# Patient Record
Sex: FEMALE | Race: WHITE | URBAN - METROPOLITAN AREA
[De-identification: names, ages, dates, MRNs, and addresses within clinical notes are randomized per-mention and may not be internally consistent; named-entity substitution may affect disease eponyms.]

---

## 2019-07-01 ENCOUNTER — HISTORICAL (OUTPATIENT)
Dept: ADMINISTRATIVE | Facility: HOSPITAL | Age: 24
End: 2019-07-01

## 2019-07-01 LAB — PRODUCT READY: NORMAL

## 2019-09-01 ENCOUNTER — HOSPITAL ENCOUNTER (OUTPATIENT)
Dept: MEDSURG UNIT | Facility: HOSPITAL | Age: 24
End: 2019-09-03
Attending: SURGERY | Admitting: SURGERY

## 2019-09-01 LAB
ABS NEUT (OLG): 10.4 X10(3)/MCL (ref 2.1–9.2)
ALBUMIN SERPL-MCNC: 2.2 GM/DL (ref 3.4–5)
ALBUMIN/GLOB SERPL: 0.6 RATIO (ref 1.1–2)
ALP SERPL-CCNC: 328 UNIT/L (ref 38–126)
ALT SERPL-CCNC: 52 UNIT/L (ref 12–78)
APPEARANCE, UA: CLEAR
AST SERPL-CCNC: 67 UNIT/L (ref 15–37)
BACTERIA SPEC CULT: ABNORMAL /HPF
BASOPHILS # BLD AUTO: 0.1 X10(3)/MCL (ref 0–0.2)
BASOPHILS NFR BLD AUTO: 1 %
BILIRUB SERPL-MCNC: 0.8 MG/DL (ref 0.2–1)
BILIRUB UR QL STRIP: NEGATIVE
BILIRUBIN DIRECT+TOT PNL SERPL-MCNC: 0.2 MG/DL (ref 0–0.8)
BILIRUBIN DIRECT+TOT PNL SERPL-MCNC: 0.6 MG/DL (ref 0–0.5)
BUN SERPL-MCNC: 6 MG/DL (ref 7–18)
CALCIUM SERPL-MCNC: 8.3 MG/DL (ref 8.5–10.1)
CHLORIDE SERPL-SCNC: 107 MMOL/L (ref 98–107)
CO2 SERPL-SCNC: 28 MMOL/L (ref 21–32)
COLOR UR: YELLOW
CREAT SERPL-MCNC: 0.86 MG/DL (ref 0.55–1.02)
EOSINOPHIL # BLD AUTO: 0.2 X10(3)/MCL (ref 0–0.9)
EOSINOPHIL NFR BLD AUTO: 1 %
ERYTHROCYTE [DISTWIDTH] IN BLOOD BY AUTOMATED COUNT: 13.8 % (ref 11.5–17)
GLOBULIN SER-MCNC: 3.9 GM/DL (ref 2.4–3.5)
GLUCOSE (UA): NEGATIVE
GLUCOSE SERPL-MCNC: 96 MG/DL (ref 74–106)
HCT VFR BLD AUTO: 28 % (ref 37–47)
HGB BLD-MCNC: 8.7 GM/DL (ref 12–16)
HGB UR QL STRIP: ABNORMAL
KETONES UR QL STRIP: ABNORMAL
LEUKOCYTE ESTERASE UR QL STRIP: ABNORMAL
LIPASE SERPL-CCNC: 876 UNIT/L (ref 73–393)
LYMPHOCYTES # BLD AUTO: 1.3 X10(3)/MCL (ref 0.6–4.6)
LYMPHOCYTES NFR BLD AUTO: 10 %
MCH RBC QN AUTO: 28.8 PG (ref 27–31)
MCHC RBC AUTO-ENTMCNC: 31.1 GM/DL (ref 33–36)
MCV RBC AUTO: 92.7 FL (ref 80–94)
MONOCYTES # BLD AUTO: 1.2 X10(3)/MCL (ref 0.1–1.3)
MONOCYTES NFR BLD AUTO: 9 %
NEUTROPHILS # BLD AUTO: 10.4 X10(3)/MCL (ref 2.1–9.2)
NEUTROPHILS NFR BLD AUTO: 79 %
NITRITE UR QL STRIP: NEGATIVE
PH UR STRIP: 6.5 [PH] (ref 5–9)
PLATELET # BLD AUTO: 240 X10(3)/MCL (ref 130–400)
PMV BLD AUTO: 11.1 FL (ref 9.4–12.4)
POTASSIUM SERPL-SCNC: 3.6 MMOL/L (ref 3.5–5.1)
PROT SERPL-MCNC: 6.1 GM/DL (ref 6.4–8.2)
PROT UR QL STRIP: ABNORMAL
RBC # BLD AUTO: 3.02 X10(6)/MCL (ref 4.2–5.4)
RBC #/AREA URNS HPF: 119 /HPF (ref 0–2)
SODIUM SERPL-SCNC: 142 MMOL/L (ref 136–145)
SP GR UR STRIP: 1.01 (ref 1–1.03)
SQUAMOUS EPITHELIAL, UA: 14 /HPF (ref 0–4)
UROBILINOGEN UR STRIP-ACNC: 1
WBC # SPEC AUTO: 13.2 X10(3)/MCL (ref 4.5–11.5)
WBC #/AREA URNS HPF: 36 /HPF (ref 0–3)

## 2019-09-02 LAB
ABS NEUT (OLG): 8.99 X10(3)/MCL (ref 2.1–9.2)
ALBUMIN SERPL-MCNC: 1.9 GM/DL (ref 3.4–5)
ALBUMIN/GLOB SERPL: 0.6 {RATIO}
ALP SERPL-CCNC: 433 UNIT/L (ref 38–126)
ALT SERPL-CCNC: 69 UNIT/L (ref 12–78)
AST SERPL-CCNC: 100 UNIT/L (ref 15–37)
BASOPHILS # BLD AUTO: 0 X10(3)/MCL (ref 0–0.2)
BASOPHILS NFR BLD AUTO: 0 %
BILIRUB SERPL-MCNC: 0.8 MG/DL (ref 0.2–1)
BILIRUBIN DIRECT+TOT PNL SERPL-MCNC: 0.1 MG/DL (ref 0–0.8)
BILIRUBIN DIRECT+TOT PNL SERPL-MCNC: 0.7 MG/DL (ref 0–0.2)
BUN SERPL-MCNC: 6 MG/DL (ref 7–18)
CALCIUM SERPL-MCNC: 8 MG/DL (ref 8.5–10.1)
CHLORIDE SERPL-SCNC: 109 MMOL/L (ref 98–107)
CO2 SERPL-SCNC: 27 MMOL/L (ref 21–32)
CREAT SERPL-MCNC: 0.72 MG/DL (ref 0.55–1.02)
EOSINOPHIL # BLD AUTO: 0.1 X10(3)/MCL (ref 0–0.9)
EOSINOPHIL NFR BLD AUTO: 1 %
ERYTHROCYTE [DISTWIDTH] IN BLOOD BY AUTOMATED COUNT: 13.8 % (ref 11.5–17)
GLOBULIN SER-MCNC: 3.1 GM/DL (ref 2.4–3.5)
GLUCOSE SERPL-MCNC: 104 MG/DL (ref 74–106)
HCT VFR BLD AUTO: 26.9 % (ref 37–47)
HGB BLD-MCNC: 8.4 GM/DL (ref 12–16)
LIPASE SERPL-CCNC: 369 UNIT/L (ref 73–393)
LIPASE SERPL-CCNC: 68 UNIT/L (ref 73–393)
LYMPHOCYTES # BLD AUTO: 1.7 X10(3)/MCL (ref 0.6–4.6)
LYMPHOCYTES NFR BLD AUTO: 14 %
MCH RBC QN AUTO: 29 PG (ref 27–31)
MCHC RBC AUTO-ENTMCNC: 31.2 GM/DL (ref 33–36)
MCV RBC AUTO: 92.8 FL (ref 80–94)
MONOCYTES # BLD AUTO: 0.8 X10(3)/MCL (ref 0.1–1.3)
MONOCYTES NFR BLD AUTO: 7 %
NEUTROPHILS # BLD AUTO: 8.99 X10(3)/MCL (ref 2.1–9.2)
NEUTROPHILS NFR BLD AUTO: 76 %
PLATELET # BLD AUTO: 254 X10(3)/MCL (ref 130–400)
PMV BLD AUTO: 11.6 FL (ref 9.4–12.4)
POTASSIUM SERPL-SCNC: 4.1 MMOL/L (ref 3.5–5.1)
PROT SERPL-MCNC: 5 GM/DL (ref 6.4–8.2)
RBC # BLD AUTO: 2.9 X10(6)/MCL (ref 4.2–5.4)
SODIUM SERPL-SCNC: 143 MMOL/L (ref 136–145)
WBC # SPEC AUTO: 11.9 X10(3)/MCL (ref 4.5–11.5)

## 2019-09-03 LAB
ABS NEUT (OLG): 7.95 X10(3)/MCL (ref 2.1–9.2)
ALBUMIN SERPL-MCNC: 2.1 GM/DL (ref 3.4–5)
ALBUMIN/GLOB SERPL: 0.7 {RATIO}
ALP SERPL-CCNC: 309 UNIT/L (ref 38–126)
ALT SERPL-CCNC: 54 UNIT/L (ref 12–78)
AMYLASE SERPL-CCNC: 41 UNIT/L (ref 25–115)
AST SERPL-CCNC: 38 UNIT/L (ref 15–37)
BASOPHILS # BLD AUTO: 0.1 X10(3)/MCL (ref 0–0.2)
BASOPHILS NFR BLD AUTO: 0 %
BILIRUB SERPL-MCNC: 0.4 MG/DL (ref 0.2–1)
BILIRUBIN DIRECT+TOT PNL SERPL-MCNC: 0.2 MG/DL (ref 0–0.2)
BILIRUBIN DIRECT+TOT PNL SERPL-MCNC: 0.2 MG/DL (ref 0–0.8)
BUN SERPL-MCNC: 5 MG/DL (ref 7–18)
CALCIUM SERPL-MCNC: 8.5 MG/DL (ref 8.5–10.1)
CHLORIDE SERPL-SCNC: 110 MMOL/L (ref 98–107)
CO2 SERPL-SCNC: 25 MMOL/L (ref 21–32)
CREAT SERPL-MCNC: 0.83 MG/DL (ref 0.55–1.02)
EOSINOPHIL # BLD AUTO: 0.2 X10(3)/MCL (ref 0–0.9)
EOSINOPHIL NFR BLD AUTO: 2 %
ERYTHROCYTE [DISTWIDTH] IN BLOOD BY AUTOMATED COUNT: 13.9 % (ref 11.5–17)
GLOBULIN SER-MCNC: 3 GM/DL (ref 2.4–3.5)
GLUCOSE SERPL-MCNC: 79 MG/DL (ref 74–106)
HCT VFR BLD AUTO: 26.8 % (ref 37–47)
HGB BLD-MCNC: 8.2 GM/DL (ref 12–16)
LYMPHOCYTES # BLD AUTO: 1.7 X10(3)/MCL (ref 0.6–4.6)
LYMPHOCYTES NFR BLD AUTO: 15 %
MCH RBC QN AUTO: 28.2 PG (ref 27–31)
MCHC RBC AUTO-ENTMCNC: 30.6 GM/DL (ref 33–36)
MCV RBC AUTO: 92.1 FL (ref 80–94)
MONOCYTES # BLD AUTO: 1 X10(3)/MCL (ref 0.1–1.3)
MONOCYTES NFR BLD AUTO: 9 %
NEUTROPHILS # BLD AUTO: 7.95 X10(3)/MCL (ref 2.1–9.2)
NEUTROPHILS NFR BLD AUTO: 72 %
PLATELET # BLD AUTO: 332 X10(3)/MCL (ref 130–400)
PMV BLD AUTO: 11 FL (ref 9.4–12.4)
POTASSIUM SERPL-SCNC: 3.8 MMOL/L (ref 3.5–5.1)
PROT SERPL-MCNC: 5.1 GM/DL (ref 6.4–8.2)
RBC # BLD AUTO: 2.91 X10(6)/MCL (ref 4.2–5.4)
SODIUM SERPL-SCNC: 144 MMOL/L (ref 136–145)
WBC # SPEC AUTO: 11.1 X10(3)/MCL (ref 4.5–11.5)

## 2019-09-04 LAB — FINAL CULTURE: NORMAL

## 2019-10-01 ENCOUNTER — HISTORICAL (OUTPATIENT)
Dept: ADMINISTRATIVE | Facility: HOSPITAL | Age: 24
End: 2019-10-01

## 2019-10-04 LAB — FINAL CULTURE: NORMAL

## 2019-10-08 LAB
FINAL CULTURE: NORMAL
FINAL CULTURE: NORMAL

## 2021-10-08 ENCOUNTER — HISTORICAL (OUTPATIENT)
Dept: ADMINISTRATIVE | Facility: HOSPITAL | Age: 26
End: 2021-10-08

## 2021-10-08 LAB — PRODUCT READY: NORMAL

## 2022-04-30 NOTE — OP NOTE
Patient:   Bettina Delacruz            MRN: [id number]            FIN: [id number]               Age:   23 years     Sex:  Female     :  1995   Associated Diagnoses:   None   Author:   Ambika Barrios MD      Brief Operative Note   Operative Information   Date/ Time:  9/3/2019 12:00:00.     Preoperative Diagnosis: Cholecystitis  .     Postoperative Diagnosis: same  .     Procedures Performed: laparoscopic Cholecystectomy.     Surgeon: Ambika Barrios MD.     Assistant: Vinay Thomas MD.     Speciman Removed: gallbladder.     Esimated blood loss: loss  15  cc.     Description of Procedure/Findings/    Complications: After informed consent was obtained, the patient was taken to the operative suite and general endotracheal anesthesia was induced. The abdomen was prepped and draped in the normal sterile fashion and a time out was called confirming patient and procedure. A 5mm incision was made just above the umbilicus, a veres needle was used to establish peritoneal insufflation and an Optiview trocar was used to place the camera into the peritoneum. Once the abdomen was insufflated to 15mmHg, under direct visualization, an 11mm trocar was placed in the epigastric region as well as 2 5mm trocars in the right subcostal region. Using graspers to elevate the fundus of the gallbladder over the liver, the cystic duct and cystic artery were identified and a critical view was obtained. I then doubly clipped and ligated the artery and duct. The gallbladder was then removed from the hepatic bed using hook cautery and removed from the abdomen throught the epigastric 11 mm port site. We inspected our clips, they remained in good position, ensured hemostasis, and removed our trocars under direct visualization. The 11mm port site fascia was closed with 0 Vicryl and the skin of the ports were closed with 4-0 vicryl. Sterile dressings were applied. All sponge and needle counts were correct at the  completion of the procedure. The patient was awakened from general anesthesia, extubated, and taken to the PACU in stable condition. .

## 2022-04-30 NOTE — ED PROVIDER NOTES
"   Patient:   Bettina Delacruz            MRN: [id number]            FIN: [id number]               Age:   23 years     Sex:  Female     :  1995   Associated Diagnoses:   Acute cholecystitis   Author:   Davina MALONE MD, Scot SONI      Basic Information   Time seen: Date & time 2019 20:24:00.   History source: Family.   Arrival mode: Wheelchair.   History limitation: Lethargic.   Additional information: Patient's physician(s): Adolph Turcios MD   Provider/Visit info:    No qualifying data available.   .   History of Present Illness   The patient presents with 24y/o F presents to the ED with abdominal pain with n/v.  x 2 day. DX with gallstones. Wendy BOWLES I, Dr. Ghosh, assumed care of this patient at .  23 year old CF with hx of gallstones presents to the ED lethargic, due to taking Percocet tonight for pain. She had a  2 days ago. Her family says she was discharged from the hospital a few hours ago, and she had a "gall bladder attack" with abdominal pain today after being put back on her regular diet.  Her gall bladder attacks have been happening for weeks now. Her family says she was shaking earlier, but deny patient having fever, diarrhea, or constipation..  The onset was just prior to arrival.  The course/duration of symptoms is constant.  The character of symptoms is "gall bladder attack".  The degree at onset was severe.  The Location of pain at onset was abdominal.  The degree at present is severe.  The Location of pain at present is abdominal.  Radiating pain: Unable to obtain due to patient being lethargic. The exacerbating factor is Unable to obtain due to patient being lethargic.  The relieving factor is Unable to obtain due to patient being lethargic.  Therapy today: none.  Risk factors consist of recent surgery and Gall stones.  Associated symptoms: Unable to obtain due to patient being lethargic.        Review of Systems   Constitutional " symptoms:  Weakness, fatigue, no fever, no chills, no sweats.    Skin symptoms:  No rash,    Eye symptoms:  No recent vision problems,    ENMT symptoms:  No ear pain,    Respiratory symptoms:  No shortness of breath, no orthopnea.    Cardiovascular symptoms:  No chest pain, no palpitations.    Gastrointestinal symptoms:  Abdominal pain, nausea, no vomiting, no diarrhea.    Genitourinary symptoms:  No dysuria, no hematuria.    Musculoskeletal symptoms:  No back pain, no Muscle pain.    Psychiatric symptoms:  No anxiety, no depression.    Allergy/immunologic symptoms:  No seasonal allergies, no food allergies.              Additional review of systems information: Unable to obtain due to: Patient is lethargic from taking Percocet.      Health Status   Allergies:    Allergic Reactions (Selected)  Severity Not Documented  Ceclor- Rash.  Penicillin- Rash.  Sulfa drugs- Rash.  Vancomycin- Rash..   Medications: Per nurse's notes.   Immunizations: Up to date.   Menstrual history: Per nurse's notes.      Past Medical/ Family/ Social History   Medical history:    Resolved  Pregnant ([id number]): Onset on 2018 at 22 years.  Resolved on 2019 at 23 years., Gall stones.   Surgical history:     delivery only; ([id number]) on 2019 at 23 Years.   Section on 2019 at 23 Years.  Comments:  2019 15:49 CDT - Vanita BARCLAY, Ethel POTTS  auto-populated from documented surgical case  Biopsy ([id number]) in 2017 at 22 Years.  Comments:  2019 9:32 Julia Andujar RN  on the neck, lump removal.   Family history:    Entire family history is negative..   Social history:    Social & Psychosocial Habits    Tobacco  2019  Use: Never (less than 100 in l    Patient Wants Consult For Cessation Counseling N/A    Abuse/Neglect  2019  SHX Any signs of abuse or neglect No    Feels unsafe at home: Yes  , Alcohol use: Denies, Tobacco use: Denies, Drug use: Denies, Occupation.   Problem list:    Active  "Problems (4)  Acute cholecystitis   Asthma   Failed induction of labor   Rh negative   .      Physical Examination               Vital Signs   Vital Signs   9/1/2019 20:22 CDT       Temperature Oral          37.2 DegC                             Temperature Oral (calculated)             98.96 DegF                             Peripheral Pulse Rate     95 bpm                             Respiratory Rate          20 br/min                             SpO2                      99 %                             Oxygen Therapy            Room air                             Systolic Blood Pressure   139 mmHg                             Diastolic Blood Pressure  79 mmHg  .   Measurements   9/1/2019 20:22 CDT       Weight Dosing             122.5 kg                             Weight Measured and Calculated in Lbs     270.06 lb                             Weight Estimated          122.5 kg                             Height/Length Dosing      162.5 cm  .   Basic Oxygen Information   9/1/2019 20:22 CDT       SpO2                      99 %                             Oxygen Therapy            Room air  Neurological:  Level of consciousness: Lethargic, from taking Percocet.   Skin:  Warm, pink, intact, moist, no rash   Head:  Normocephalic, atraumatic   Cardiovascular:  Regular rate and rhythm, No murmur, Normal peripheral perfusion, No edema   Respiratory:  Lungs are clear to auscultation, respirations are non-labored, breath sounds are equal, Symmetrical chest wall expansion   Gastrointestinal:  Soft, Non distended, Normal bowel sounds, Moderate epigastric discomfort and left upper quadrant tenderness   Lymphatics:  No lymphadenopathy.      Medical Decision Making   Documents reviewed:  Emergency department nurses' notes, emergency department records.    Orders  Launch Order Profile (Selected)   Inpatient Orders  Ordered  30 Day Readmission: 09/01/19 20:25:06 CDT, Stop date 09/01/19 20:25:06 CDT, "This patient has had an " "inpatient, observation, outpatient bedded or emergency visit within the last 30 days."  Normal Saline (0.9% NS) IV 1,000 mL: 1,000 mL, 1,000 mL, IV, 999 mL/hr, start date 09/01/19 20:25:00 CDT, 2.22, m2  Zofran 2 mg/mL injectable solution: 4 mg, form: Injection, IV Push, Once, first dose 09/01/19 20:25:00 CDT, stop date 09/01/19 20:25:00 CDT, STAT  morphine 4 mg/mL preservative-free intravenous solution: 4 mg, form: Injection, IV, Once, first dose 09/01/19 20:25:00 CDT, stop date 09/01/19 20:25:00 CDT, STAT, ( > 7 on pain scale)  Ordered (Dispatched)  UA Total a reflex to culture: Stat collect, Urine, 09/01/19 20:25:00 CDT, Stop date 09/01/19 20:25:00 CDT, Nurse collect, Print Label By Order Location  Ordered (Exam Ordered)  US Abdomen Limited: Stat, 09/01/19 20:48:00 CDT, Abdominal Pain, None, Ambulatory, Rad Type, Schedule this test, 09/01/19 20:48:00 CDT  Ordered (In-Lab)  CMP: STAT collect, 09/01/19 20:42:09 CDT, BLOOD, Collected, Stop date 09/01/19 20:25:00 CDT, Lab Collect  Lipase Level: STAT collect, 09/01/19 20:42:09 CDT, BLOOD, Collected, Stop date 09/01/19 20:25:00 CDT, Lab Collect  Completed  Automated Diff: Now collect, 09/01/19 20:25:00 CDT, Blood, Collected, Stop date 09/01/19 20:25:00 CDT, Lab Collect, Print Label By Order Location, 09/01/19 20:25:00 CDT  CBC w/ Auto Diff: NOW collect, 09/01/19 20:42:09 CDT, BLOOD, Collected, Stop date 09/01/19 20:25:00 CDT, Lab Collect.   Pregnancy test:  UCG-negative.   Results review:  Lab results : Lab View   9/1/2019 20:25 CDT       Sodium Lvl                142 mmol/L                             Potassium Lvl             3.6 mmol/L                             Chloride                  107 mmol/L                             CO2                       28.0 mmol/L                             Calcium Lvl               8.3 mg/dL  LOW                             Glucose Lvl               96 mg/dL                             BUN                       6.0 mg/dL  LOW      "                        Creatinine                0.86 mg/dL                             eGFR-AA                   >60 mL/min/1.73 m2  NA                             eGFR-EMETERIO                  >60 mL/min/1.73 m2  NA                             Bili Total                0.8 mg/dL                             Bili Direct               0.60 mg/dL  HI                             Bili Indirect             0.20 mg/dL                             AST                       67 unit/L  HI                             ALT                       52 unit/L                             Alk Phos                  328 unit/L  HI                             Total Protein             6.1 gm/dL  LOW                             Albumin Lvl               2.20 gm/dL  LOW                             Globulin                  3.90 gm/dL  HI                             A/G Ratio                 0.6 ratio  LOW                             Lipase Lvl                876 unit/L  HI                             WBC                       13.2 x10(3)/mcL  HI                             RBC                       3.02 x10(6)/mcL  LOW                             Hgb                       8.7 gm/dL  LOW                             Hct                       28.0 %  LOW                             Platelet                  240 x10(3)/mcL                             MCV                       92.7 fL                             MCH                       28.8 pg                             MCHC                      31.1 gm/dL  LOW                             RDW                       13.8 %                             MPV                       11.1 fL                             Abs Neut                  10.40 x10(3)/mcL  HI                             Neutro Auto               79 %  NA                             Lymph Auto                10 %  NA                             Mono Auto                 9 %  NA                             Eos Auto                  1 %   NA                             Abs Eos                   0.2 x10(3)/mcL                             Basophil Auto             1 %  NA                             Abs Neutro                10.40 x10(3)/mcL  HI                             Abs Lymph                 1.3 x10(3)/mcL                             Abs Mono                  1.2 x10(3)/mcL                             Abs Baso                  0.1 x10(3)/mcL    8/31/2019 14:24 CDT      TRANSFUSED                TRANSFUSED    8/31/2019 6:48 CDT       WBC                       14.2 x10(3)/mcL  HI                             RBC                       2.91 x10(6)/mcL  LOW                             Hgb                       8.4 gm/dL  LOW                             Hct                       26.4 %  LOW                             Platelet                  199 x10(3)/mcL                             MCV                       90.7 fL                             MCH                       28.9 pg                             MCHC                      31.8 gm/dL  LOW                             RDW                       13.6 %                             MPV                       12.4 fL                             Abs Neut                  10.70 x10(3)/mcL  HI                             Neutro Auto               75 %  NA                             Lymph Auto                14 %  NA                             Mono Auto                 9 %  NA                             Eos Auto                  0 %  NA                             Abs Eos                   0.1 x10(3)/mcL                             Basophil Auto             0 %  NA                             Abs Neutro                10.70 x10(3)/mcL  HI                             Abs Lymph                 2.0 x10(3)/mcL                             Abs Mono                  1.2 x10(3)/mcL                             Abs Baso                  0.1 x10(3)/mcL                             Fetal Screen               Negative                             Vials of RhIg             1  NA                             Product Ready             1 RHIG ready  .   Radiology results:  Rad Results (ST)  < 12 hrs   Accession: [id number]  Order: US Abdomen Limited  Report Dt/Tm: 09/01/2019 21:48  Report:   EXAMINATION: Limited abdominal ultrasound     EXAMINATION DATE: 9/1/2019     COMPARISON: 8/29/2019     TECHNIQUE: Multiple sonographic images of the right upper quadrant  obtained by department sonographer utilizing transabdominal imaging.     CLINICAL HISTORY: Abdominal pain     FINDINGS:     The liver demonstrates normal echogenicity. Liver capsule smooth. No  focal hepatic lesion. No intrahepatic biliary ductal dilatation.  Portal vein is patent. Hepatopedal flow is noted. The gallbladder is  present and slightly contracted. There is prominence of the  gallbladder wall which we secondary to contraction. Multiple calcified  gallstones which appear to be mobile. The common duct measures 5 mm.  Per the sonographer at the sonographic Alvarez sign is positive.     The right kidney measures 11.8 cm in length. There is no  hydronephrosis or calculus. The parenchyma demonstrates normal  echogenicity.     The visualized pancreas, abdominal aorta, and IVC are within normal  limits.     IMPRESSION:      When compared to the prior examination from 2 days prior several  mobile calcified gallstones are now identified and/or new. On today's  examination the gallbladder appears partially contracted with mild  prominence of the gallbladder wall. Per the sonographer the  sonographic Alvarez sign is positive. If there is persistent clinical  concern for cholecystitis recommend correlation with HIDA scan.      .      Reexamination/ Reevaluation   Time: 9/1/2019 22:55:00 .   Interventions: Patient with biliary colic evaluated by surgery will admit.      Impression and Plan   Diagnosis   Acute cholecystitis ([id number] K81.0)      Calls-Consults   -  ghazala ronquillo  zhang.   Plan   Condition: Improved, Stable.    Disposition: Admit time  9/1/2019 22:57:00, Admit to Inpatient Unit.    Counseled: Patient, Family, Regarding diagnosis, Regarding diagnostic results, Regarding treatment plan, Regarding prescription, Patient indicated understanding of instructions, Family understood. Patient could not be counseled due to lethargy.    Notes: I, Jenn Galloway, acted solely as a scribe for and in the presence of Dr. Ghosh who performed the service.  ,       This scribes note accurately reflects the work done by me I have reviewed the note and personally performed a history and physical and agree with all the documentation and findings.

## 2022-05-04 NOTE — HISTORICAL [facility] CERNER
This is a historical note converted from Margaret. Formatting and pictures may have been removed.  Please reference Margaret for original formatting and attached multimedia. Chief Complaint  pt 2 days post  section known gallstones. c/o pain and nausea. percocet taken within the last hour. Dr. Turcios sent here.  History of Present Illness  23-year-old female?who is 2 days postpartum?now presents with complaints of?epigastric/right upper quadrant abdominal pain?that began 2 weeks prior.? She states the pain associated with nausea?but no vomiting.? She has never experienced this pain before in the past.? Denies any fevers, chills,?chest pain, shortness of breath,?recent sick?contacts, or any other issues.? Denies any association with food. ?States the pain is quite random?but always localized to the right upper quadrant/epigastrium.? Patient is very tearful as she had to leave her  at home.? She states the pain was bad enough that she had to?come into the hospital.? Previous abdominal surgical history includes a  2 days prior.  Review of Systems  Negative except as mentioned in HPI  Physical Exam  Vitals & Measurements  T:?37.2? ?C (Oral)? HR:?101(Peripheral)? RR:?18? BP:?151/87? SpO2:?98%? WT:?122.5?kg?  AFVSS  Gen - NAD  Neuro -appropriately awake, alert, and responsive; GCS 15  HEENT - NCAT, EOMI, trachea midline  Heart - RRR  Chest - NWOB on RA  Abd - soft, ND, mildly TTP in RUQ/epigastrium,?+ [place] sign, Pfannenstiel C/D/I and healing well  MSK - FROM and good strength x 4 exts  Exts - 2+ DPs and rads bilat  Psych - appropriate mood  ?   Labs:  K 3.6, Cr 0.86  Tbili 0.8 (Dbili 0.6), AST/ALT 67/52, Alk Phos 328  Lipase 876  WBC 13.2, H/H 8.7/28.0, Plts 240  ?   Imaging:  RUQ U/S - When compared to the prior examination from 2 days prior several mobile calcified gallstones are now identified and/or new. On todays examination the gallbladder appears partially contracted with mild prominence of  the gallbladder wall. Per the sonographer the sonographic Alvarez sign is positive. Common duct 5mm.  Assessment/Plan  24 y/o F freshly post-partum from 2 days prior with?a 2-week history of right upper quadrant/epigastric?abdominal pain?with concern for?early acute cholecystitis.  1) Admit with Surgery  2) Keep NPO with IVFs  3) Start IV abx  4) Repeat CBC, CMP, and Lipase?in AM  5) Most likely to OR for lap rene tomorrow if labs WNL   Problem List/Past Medical History  Ongoing  Acute cholecystitis  Asthma  Rh negative  Historical  Pregnant  Procedure/Surgical History   delivery only; (2019)   Section (2019)  Biopsy (2017)   Medications  Inpatient  Normal Saline (0.9% NS) IV 1,000 mL, 1000 mL, IV  Home  esomeprazole 40 mg oral DR capsule, 40 mg= 1 cap(s), Oral, Daily,? ?Not Taking, Completed Rx  hydrOXYzine pamoate 25 mg oral capsule, 25 mg= 1 cap(s), Oral, qPM,? ?Not taking  ibuprofen 600 mg oral tablet, 600 mg= 1 tab(s), Oral, q6hr  NexIUM 40 mg oral delayed release capsule, 40 mg= 1 cap(s), Oral, Daily  ondansetron 8 mg oral tablet, 8 mg= 1 tab(s), Oral, q6hr  Percocet 5/325 oral tablet, 1 tab(s), Oral, q4hr, PRN  Vistaril 25 mg oral capsule, 25 mg= 1 cap(s), Oral, qPM  Allergies  Ceclor?(Rash)  penicillin?(Rash)  sulfa drugs?(Rash)  vancomycin?(Rash)  Social History  Abuse/Neglect  No, Yes, 2019  Tobacco  Never (less than 100 in lifetime), N/A, 2019  Family History  Family history is negative      Agree with above assessment and plan. Patient seen and examined with Dr. Syed.  Cholecystitis. Admit. Antibiotics. NPO/IVF. To OR for lap rene.

## 2022-05-04 NOTE — HISTORICAL [facility] C[facility]ER
This is a historical note converted from Margaret. Formatting and pictures may have been removed.  Please reference Margaret for original formatting and attached multimedia. [facility] Clinic Note  ?  23 year old female s/p laparoscopic cholecystectomy and doing well. No complaints. No abdominal pain. Tolerating regular diet. Some nausea, no vomiting.  ?  Gen: NAD  HEENT: normocephalic, atraumatic, no scleral icterus  CV: regular rate and rhythm  Resp: no increased work of breathing, STANTON  GI: abdomen soft, not tender, not distended, well healing laparoscopic port-site incisions with dermabond  Vasc: warm and well-perfused  ?   A/P: 23 year old female s/p lap rene  -Doing well  -Follow up PRN  ?  ?   Lucy Elliott MD  [employer] General Surgery, PGY-1   Above reviewed.  Path revealed chronic cholecystitis and cholelithiasis, as well as cholesterolosis.  Agree with F/U prn.

## 2022-05-04 NOTE — HISTORICAL [facility]ERNER
This is a historical note converted from [place]. Formatting and pictures may have been removed.  Please reference [place] for original formatting and attached multimedia. Admit and Discharge Dates  Admit Date: 09/01/2019  Discharge Date: 09/03/2019  Physicians  Attending Physician - Syd WAY MD, Bubba ZARATE  Admitting Physician - Syd WAY MD, Bubba ZARATE  Primary Care Physician - Karolina ABRAHAM, Adolph MELGAR  Discharge Diagnosis  Abdominal pain?[id number]  Acute cholecystitis?K81.0  Surgical Procedures  09/03/2019 - [id number] - Cholecystectomy Laparoscopic  Immunizations  No immunizations recorded for this visit.  Admission Information  24 y/o F freshly post-partum with?a 2-week history of right upper quadrant/epigastric?abdominal pain?with concern for?early acute cholecystitis.  Significant Findings  Patient had RUQ abdominal pain and nausea during her admission with some improvement prior to going to the OR for laparoscopic cholecystectomy. After surgery she reported excellent pain control, had no nausea/vomiting, was tolerating a diet, ambulating, and voiding spontaneously. She was determined to be stable for discharge home on 9/3.  Time Spent on discharge  20 minutes spent on discharge planning and patient education  Objective  Vitals & Measurements  /86  HR 80  SpO2 98 on RA  Temp 98.42  Physical Exam  Gen: NAD  HEENT: normocephalic, atraumatic, no scleral icterus  CV: regular rate and rhythm  Resp: no increased work of breathing, STANTON  GI: abdomen soft, appropriately TTP, not distended, laparoscopic port-sites with steri-strips in place c/d/i  : no gonzalez  Vasc: warm and well-perfused, distal pulses intact  Ext: no LE edema  Patient Discharge Condition  Excellent  Discharge Disposition  Discharge home   Discharge Medication Reconciliation  Continue  acetaminophen-oxyCODONE (Percocet 5/325 oral tablet)?1 tab(s), Oral, q4hr, PRN for pain  esomeprazole (esomeprazole 40 mg oral   capsule)?40 mg, Oral, Daily  hydrOXYzine (hydrOXYzine pamoate 25 mg oral capsule)?25 mg, Oral, qPM  ibuprofen (ibuprofen 600 mg oral tablet)?600 mg, Oral, q6hr  ondansetron (ondansetron 8 mg oral tablet)?8 mg, Oral, q6hr  Discontinue  esomeprazole (NexIUM 40 mg oral delayed release capsule)?40 mg, Oral, Daily  hydrOXYzine (Vistaril 25 mg oral capsule)?25 mg, Oral, qPM  Education and Orders Provided  Incentive Spirometer (Custom)  Laparoscopic Cholecystectomy, Care After, Easy-to-Read  Cholecystitis, Easy-to-Read  Discharge - 09/03/19 16:30:00 CDT, Home, Give all scheduled vaccinations prior to discharge.?  Discharge Activity - Activity as Tolerated, No heavy lifting for 4 weeks?  Discharge Diet - Regular, Advance diet from liquids to regular as tolerated.?  Discharge Wound Care - 09/03/19 16:30:00 CDT, At Discharge, Stop date 09/03/19 16:30:00 CDT, You may shower and allow soap and water to run over incisions. Do not scrub incisions. Allow skin glue to remain in place and come off on its own.?  Follow up  [facility] Hospitalist Clinic Appointment, on 09/16/2019  ????Keep scheduled appointment      I agree with resident documentation. I was physically present, supervised resident, ?and discussed plan of care.